# Patient Record
Sex: FEMALE | Race: OTHER | URBAN - METROPOLITAN AREA
[De-identification: names, ages, dates, MRNs, and addresses within clinical notes are randomized per-mention and may not be internally consistent; named-entity substitution may affect disease eponyms.]

---

## 2020-08-19 ENCOUNTER — HOSPITAL ENCOUNTER (OUTPATIENT)
Age: 20
Discharge: HOME | End: 2020-08-21

## 2020-08-19 ENCOUNTER — OFFICE VISIT (OUTPATIENT)
Dept: PRIMARY CARE CLINIC | Age: 20
End: 2020-08-19

## 2020-08-19 VITALS
BODY MASS INDEX: 23.74 KG/M2 | WEIGHT: 129 LBS | HEIGHT: 62 IN | HEART RATE: 68 BPM | SYSTOLIC BLOOD PRESSURE: 102 MMHG | OXYGEN SATURATION: 98 % | DIASTOLIC BLOOD PRESSURE: 62 MMHG | TEMPERATURE: 98.1 F

## 2020-08-19 PROCEDURE — 85660 RBC SICKLE CELL TEST: CPT

## 2020-08-19 PROCEDURE — 99213 OFFICE O/P EST LOW 20 MIN: CPT | Performed by: NURSE PRACTITIONER

## 2020-08-19 NOTE — PROGRESS NOTES
Chief Complaint:   Annual Exam (tennis physical)      History of Present Illness   Source of history provided by:  patient. Dari Brock is a 23 y.o. old female who has a past medical history of There is no problem list on file for this patient. presents to the [facility] for a sports physical.  Pt states she is feeling well without any complaints at this time. She denies any CP with exertion, RODAS, dizziness with exertion, history of syncope without trauma, palpitations, heavy menstrual periods, chance of pregnancy, weakness in extremities, recent illness, previous cardiac issues, seizure history, or asthma history. Denies any family history of sudden cardiac death. Pt denies any drug, ETOH, or tobacco use. Wears seat belt in the car at all times. Denies any thoughts of suicide or issues at school relating to bullying. ROS    Unless otherwise stated in this report or unable to obtain because of the patient's clinical or mental status as evidenced by the medical record, this patients's positive and negative responses for Review of Systems, constitutional, psych, eyes, ENT, cardiovascular, respiratory, gastrointestinal, neurological, genitourinary, musculoskeletal, integument systems and systems related to the presenting problem are either stated in the preceding or were not pertinent or were negative for the symptoms and/or complaints related to the medical problem. Past Surgical History:   Past Surgical History:   Procedure Laterality Date    APPENDECTOMY       Social History:  reports that she has never smoked. She has never used smokeless tobacco.  Family History: family history is not on file. Allergies: Patient has no known allergies.     Physical Exam         VS:  /62   Pulse 68   Temp 98.1 °F (36.7 °C)   Ht 5' 2\" (1.575 m)   Wt 129 lb (58.5 kg)   SpO2 98%   BMI 23.59 kg/m²    Oxygen Saturation Interpretation: Normal.    Constitutional:  A&Ox3, NAD, development consistent with age. Head:  NCAT  Eyes:  EOMI, PERRLA. No conjunctival injection noted. Ears:  External ears without lesions. Ear canals without swelling or exudate. TMs translucent bilaterally with normal light reflex. Throat:  Posterior pharynx without erythema or exudate. Airway patient. Neck:  Supple. Nontender without adenopathy or thyromegaly. Lungs: CTAB without wheezing, rales, or rhonchi. Heart:  RRR, normal heart sounds without pathological murmurs. Abdomen:  Soft, nontender, and nondistended. BS+x4. No guarding, rigidity, or rebound tenderness. No masses. Back:  ROM physiologic. Normal posture and spinal alignment. No costovertebral, paravertebral, intervertebral, or vertebral tenderness or spasm. Extremities:  No tenderness or swelling. ROM physiologic. No neurovascular deficit. Strength and  5/5 bilaterally. Skin:  Warm and appropriately dry without rashes, abrasions, ecchymoses, or lesions. Neuro:  Orientation age-appropriate. Motor functions intact. DTRs 2+ and equal bilaterally. Psych: Pleasant and appropriate. Normal mood and affect. Lab / Imaging Results   (All laboratory and radiology results have been personally reviewed by myself)  Labs:  No results found for this visit on 08/19/20. Imaging: All Radiology results interpreted by Radiologist unless otherwise noted. No orders to display       Assessment / Plan     Impression(s):    1. Sports physical  Pt appears to be in good health overall. She is cleared for sports for one year from this date without restrictions. Sports physical form scanned to chart. Advised to return immediately with any changes in physical or mental health. All questions answered.

## 2020-08-20 LAB — SICKLE CELL SCREEN: NEGATIVE

## 2021-01-08 ENCOUNTER — NURSE ONLY (OUTPATIENT)
Dept: PRIMARY CARE CLINIC | Age: 21
End: 2021-01-08

## 2021-01-08 DIAGNOSIS — Z20.822 SUSPECTED COVID-19 VIRUS INFECTION: ICD-10-CM

## 2021-01-08 DIAGNOSIS — Z20.822 SUSPECTED COVID-19 VIRUS INFECTION: Primary | ICD-10-CM

## 2021-01-09 LAB
SARS-COV-2: NOT DETECTED
SOURCE: NORMAL

## 2021-01-12 ENCOUNTER — NURSE ONLY (OUTPATIENT)
Dept: PRIMARY CARE CLINIC | Age: 21
End: 2021-01-12

## 2021-01-12 DIAGNOSIS — Z20.822 SUSPECTED COVID-19 VIRUS INFECTION: ICD-10-CM

## 2021-01-12 DIAGNOSIS — Z20.822 SUSPECTED COVID-19 VIRUS INFECTION: Primary | ICD-10-CM

## 2021-01-14 LAB
SARS-COV-2: NOT DETECTED
SOURCE: NORMAL

## 2021-04-07 ENCOUNTER — OFFICE VISIT (OUTPATIENT)
Dept: PRIMARY CARE CLINIC | Age: 21
End: 2021-04-07

## 2021-04-07 VITALS
OXYGEN SATURATION: 98 % | TEMPERATURE: 97.8 F | SYSTOLIC BLOOD PRESSURE: 112 MMHG | WEIGHT: 135 LBS | HEIGHT: 62 IN | DIASTOLIC BLOOD PRESSURE: 70 MMHG | HEART RATE: 70 BPM | BODY MASS INDEX: 24.84 KG/M2

## 2021-04-07 DIAGNOSIS — Z20.822 EXPOSURE TO COVID-19 VIRUS: ICD-10-CM

## 2021-04-07 DIAGNOSIS — Z20.822 EXPOSURE TO COVID-19 VIRUS: Primary | ICD-10-CM

## 2021-04-07 PROCEDURE — 99213 OFFICE O/P EST LOW 20 MIN: CPT | Performed by: NURSE PRACTITIONER

## 2021-04-07 NOTE — PROGRESS NOTES
Chief Complaint   Concern For COVID-19 (exposed sunday)      History of Present Illness   Source of history provided by: patient. John Loza is a 23 y.o. old female who presents to walk-in for evaluation and COVID testing after COVID-19 exposure 4 days ago. The patient was exposed to teammate. The patient is currently asymptomatic and feeling well. Patient denies COVID-19 infection in the last 90 days and has received the COVID-19 vaccination, Vivi  #1. Denies any fever, chills, CP, dyspnea, LE edema, abdominal pain, vomiting, rash, or lethargy. Denies any hx of asthma, recurrent bronchitis, COPD, pneumonia. Denies any tobacco use. ROS   Pertinent positives and negatives are stated within HPI, all other systems reviewed and are negative. Past Medical History:  has no past medical history on file. Surgical History:  has a past surgical history that includes Appendectomy. Social History:  reports that she has never smoked. She has never used smokeless tobacco.  Family History: family history is not on file. Allergies: Patient has no known allergies. Physical Exam   VS:  /70   Pulse 70   Temp 97.8 °F (36.6 °C)   Ht 5' 2\" (1.575 m)   Wt 135 lb (61.2 kg)   SpO2 98%   BMI 24.69 kg/m²    Oxygen Saturation Interpretation: Normal.    Constitutional:  Alert, development consistent with age. NAD. Head:  NC/NT. Airway patent. No TTP over maxillary and frontal sinuses. Mouth: Posterior pharynx clear with no erythema present. No tonsillar hypertrophy or exudate. Neck:  Normal ROM. Supple. No anterior cervical adenopathy noted. Lungs: CTAB without wheezes, rales, or rhonchi. CV:  Regular rate and rhythm, normal heart sounds, without pathological murmurs, ectopy, gallops, or rubs. Skin:  Normal turgor. Warm, dry, without visible rash. Lymphatic: No lymphangitis or adenopathy noted. Neurological:  Oriented. Motor functions intact.     Lab / Imaging Results   (All laboratory and radiology results have been personally reviewed by myself)  Labs:  No results found for this visit on 04/07/21. Imaging: All Radiology results interpreted by Radiologist unless otherwise noted. No results found. Medical Decision Making   Pt non-toxic in appearance, in no apparent distress and stable at time of discharge. Assessment/Plan   Heraclio Salter was seen today for concern for covid-19. Diagnoses and all orders for this visit:    Exposure to COVID-19 virus  -     COVID-19; Future    COVID-19 swab obtained and pending, will call with results once available. Advised cautionary self-quarantine at home in the interim. If testing is positive, pt should remain out of school and sports for at least 10 days from test date. Regardless of testing results, pt should also be fever free for 24 hours prior to returning to work. Patient should remain in quarantine for a minimum of 7 days since last exposure per CDC guidelines. Patient should continue to self monitor for total of 14 days. Patient advised to take zinc and vitamin C for immune support. Schedule virtual f/u with PCP in 7-10 days if symptoms present. ED immediately with high or refractory fever, progressive SOB, dyspnea, CP, calf pain/swelling, shaking chills, vomiting, abdominal pain, lethargy, flank pain, or decreased urinary output. Pt verbalizes understanding and is in agreement with plan of care. All questions answered. Return if symptoms worsen or fail to improve. Electronically signed by BEBETO Chappell CNP   DD: 4/7/21     *NOTE: This report was transcribed using voice recognition software. Every effort was made to ensure accuracy; however, inadvertent computerized transcription errors may be present.

## 2021-04-08 LAB — SARS-COV-2, PCR: DETECTED
